# Patient Record
Sex: MALE
[De-identification: names, ages, dates, MRNs, and addresses within clinical notes are randomized per-mention and may not be internally consistent; named-entity substitution may affect disease eponyms.]

---

## 2019-02-04 PROBLEM — Z00.00 ENCOUNTER FOR PREVENTIVE HEALTH EXAMINATION: Status: ACTIVE | Noted: 2019-02-04

## 2019-03-08 ENCOUNTER — APPOINTMENT (OUTPATIENT)
Dept: OTOLARYNGOLOGY | Facility: CLINIC | Age: 24
End: 2019-03-08
Payer: COMMERCIAL

## 2019-03-08 DIAGNOSIS — G81.91 HEMIPLEGIA, UNSPECIFIED AFFECTING RIGHT DOMINANT SIDE: ICD-10-CM

## 2019-03-08 DIAGNOSIS — Z78.9 OTHER SPECIFIED HEALTH STATUS: ICD-10-CM

## 2019-03-08 DIAGNOSIS — H93.11 TINNITUS, RIGHT EAR: ICD-10-CM

## 2019-03-08 PROCEDURE — 99203 OFFICE O/P NEW LOW 30 MIN: CPT

## 2019-03-08 NOTE — CONSULT LETTER
[Dear  ___] : Dear  [unfilled], [Consult Letter:] : I had the pleasure of evaluating your patient, [unfilled]. [Please see my note below.] : Please see my note below. [Consult Closing:] : Thank you very much for allowing me to participate in the care of this patient.  If you have any questions, please do not hesitate to contact me. [Sincerely,] : Sincerely, [FreeTextEntry2] : Apurva Miner MD [FreeTextEntry3] : Julia Rod MD\par Otolaryngology - Head & Neck Surgery\par

## 2019-03-08 NOTE — HISTORY OF PRESENT ILLNESS
[de-identified] : 24 year old patient is present today for tinnitus in the right ear. Tinnitus been present for 2-3 months, and the tinnitus is primarily in the right ear. Ringing comes and goes. Not that loud. Denies otalgia, and hearing loss. Pt states he did had cerumen impacted  September 2018 and it needed to be cleaned. He started using Debrox and ringing started. Denies hx ear infections, ear surgeries.

## 2019-03-08 NOTE — ASSESSMENT
[FreeTextEntry1] : - discussed management options of tinnitus, background quiet noise. Encouraged to f/up if any changes or hearing loss. \par - f/up as needed

## 2019-06-03 ENCOUNTER — RECORD ABSTRACTING (OUTPATIENT)
Age: 24
End: 2019-06-03

## 2019-06-03 DIAGNOSIS — Z87.898 PERSONAL HISTORY OF OTHER SPECIFIED CONDITIONS: ICD-10-CM

## 2019-06-03 DIAGNOSIS — R25.2 CRAMP AND SPASM: ICD-10-CM

## 2019-06-03 DIAGNOSIS — Z87.820 PERSONAL HISTORY OF TRAUMATIC BRAIN INJURY: ICD-10-CM

## 2019-06-03 DIAGNOSIS — Z86.69 PERSONAL HISTORY OF OTHER DISEASES OF THE NERVOUS SYSTEM AND SENSE ORGANS: ICD-10-CM

## 2019-06-03 DIAGNOSIS — R25.1 TREMOR, UNSPECIFIED: ICD-10-CM

## 2019-06-03 DIAGNOSIS — R26.1 PARALYTIC GAIT: ICD-10-CM

## 2019-06-03 RX ORDER — NAPROXEN SODIUM 220 MG
TABLET ORAL
Refills: 0 | Status: ACTIVE | COMMUNITY

## 2019-06-03 RX ORDER — DOXYCLYCLINE HYCLATE 150 MG/1
TABLET, COATED ORAL
Refills: 0 | Status: ACTIVE | COMMUNITY

## 2019-09-16 ENCOUNTER — APPOINTMENT (OUTPATIENT)
Dept: NEUROLOGY | Facility: CLINIC | Age: 24
End: 2019-09-16